# Patient Record
Sex: FEMALE | Race: WHITE | URBAN - METROPOLITAN AREA
[De-identification: names, ages, dates, MRNs, and addresses within clinical notes are randomized per-mention and may not be internally consistent; named-entity substitution may affect disease eponyms.]

---

## 2019-04-02 NOTE — IMPRESSION/PLAN
Impression: Other specified inflammations of eyelid: H01.8. Plan: Blepharitis accounts for pt's symptoms. Lid scrubs with Derral Lion and Derral Lion No More Tears Baby Shampoo and monitor prn.

## 2019-04-02 NOTE — IMPRESSION/PLAN
Impression: Dry eye syndrome of bilateral lacrimal glands: H04.123. Plan: Discuss diagnosis with patient. Patient instructed to start morning with Celluvisc ou, Systane Balance qid then Celluvisc HS. Cont. Lacrilube Ointment OU QHS.

## 2019-05-01 NOTE — IMPRESSION/PLAN
Impression: Other specified inflammations of eyelid: H01.8. Plan: Blepharitis accounts for pt's symptoms. Lid scrubs with Renee [place] and Renee [place] No More Tears Baby Shampoo and monitor prn.

## 2019-05-01 NOTE — IMPRESSION/PLAN
Impression: Dry eye syndrome of bilateral lacrimal glands: H04.123. Plan: Discuss diagnosis with patient. Patient instructed to continue morning with Celluvisc ou, Systane Balance qid then Celluvisc HS. Cont. Lacrilube Ointment OU QHS.

## 2021-07-20 NOTE — IMPRESSION/PLAN
Impression: Presence of intraocular lens: Z96.1. Plan: Stable. Observe. Rec. OTC readers +2.00, +2.50.

## 2022-07-21 ENCOUNTER — OFFICE VISIT (OUTPATIENT)
Dept: URBAN - METROPOLITAN AREA CLINIC 63 | Facility: CLINIC | Age: 82
End: 2022-07-21
Payer: MEDICARE

## 2022-07-21 DIAGNOSIS — H43.813 VITREOUS DEGENERATION, BILATERAL: ICD-10-CM

## 2022-07-21 DIAGNOSIS — Z96.1 PRESENCE OF INTRAOCULAR LENS: ICD-10-CM

## 2022-07-21 DIAGNOSIS — H04.123 DRY EYE SYNDROME OF BILATERAL LACRIMAL GLANDS: Primary | ICD-10-CM

## 2022-07-21 PROCEDURE — 92014 COMPRE OPH EXAM EST PT 1/>: CPT | Performed by: OPTOMETRIST

## 2022-07-21 ASSESSMENT — INTRAOCULAR PRESSURE
OD: 9
OS: 9

## 2022-07-21 ASSESSMENT — KERATOMETRY
OD: 44.00
OS: 44.63

## 2022-07-21 ASSESSMENT — VISUAL ACUITY
OD: 20/50
OS: 20/30

## 2022-07-21 NOTE — IMPRESSION/PLAN
Impression: Dry eye syndrome of bilateral lacrimal glands: H04.123. Plan: Encourage to use Artificial tears OU QID.

## 2023-07-25 ENCOUNTER — OFFICE VISIT (OUTPATIENT)
Dept: URBAN - METROPOLITAN AREA CLINIC 63 | Facility: CLINIC | Age: 83
End: 2023-07-25
Payer: MEDICARE

## 2023-07-25 DIAGNOSIS — H04.123 DRY EYE SYNDROME OF BILATERAL LACRIMAL GLANDS: Primary | ICD-10-CM

## 2023-07-25 DIAGNOSIS — Z96.1 PRESENCE OF INTRAOCULAR LENS: ICD-10-CM

## 2023-07-25 DIAGNOSIS — H43.813 VITREOUS DEGENERATION, BILATERAL: ICD-10-CM

## 2023-07-25 PROCEDURE — 99214 OFFICE O/P EST MOD 30 MIN: CPT | Performed by: OPTOMETRIST

## 2023-07-25 ASSESSMENT — KERATOMETRY
OS: 44.63
OD: 44.00

## 2023-07-25 ASSESSMENT — INTRAOCULAR PRESSURE
OS: 8
OD: 8

## 2024-08-06 ENCOUNTER — OFFICE VISIT (OUTPATIENT)
Dept: URBAN - METROPOLITAN AREA CLINIC 63 | Facility: CLINIC | Age: 84
End: 2024-08-06
Payer: MEDICARE

## 2024-08-06 DIAGNOSIS — Z96.1 PRESENCE OF INTRAOCULAR LENS: ICD-10-CM

## 2024-08-06 DIAGNOSIS — H52.13 MYOPIA, BILATERAL: ICD-10-CM

## 2024-08-06 DIAGNOSIS — H04.123 DRY EYE SYNDROME OF BILATERAL LACRIMAL GLANDS: Primary | ICD-10-CM

## 2024-08-06 DIAGNOSIS — H43.813 VITREOUS DEGENERATION, BILATERAL: ICD-10-CM

## 2024-08-06 PROCEDURE — 92014 COMPRE OPH EXAM EST PT 1/>: CPT | Performed by: OPTOMETRIST

## 2024-08-06 RX ORDER — LIFITEGRAST 50 MG/ML
5 % SOLUTION/ DROPS OPHTHALMIC
Qty: 180 | Refills: 3 | Status: ACTIVE
Start: 2024-08-06

## 2024-08-06 RX ORDER — CYCLOSPORINE 0.5 MG/ML
0.05 % EMULSION OPHTHALMIC
Qty: 180 | Refills: 3 | Status: ACTIVE
Start: 2024-08-06

## 2024-08-06 ASSESSMENT — VISUAL ACUITY
OD: 20/30
OS: 20/25

## 2024-08-06 ASSESSMENT — KERATOMETRY
OD: 44.25
OS: 41.38

## 2024-08-06 ASSESSMENT — INTRAOCULAR PRESSURE
OD: 11
OS: 12